# Patient Record
Sex: FEMALE | Race: WHITE | NOT HISPANIC OR LATINO | Employment: FULL TIME | URBAN - NONMETROPOLITAN AREA
[De-identification: names, ages, dates, MRNs, and addresses within clinical notes are randomized per-mention and may not be internally consistent; named-entity substitution may affect disease eponyms.]

---

## 2018-03-21 ENCOUNTER — OFFICE VISIT (OUTPATIENT)
Dept: SURGERY | Facility: CLINIC | Age: 38
End: 2018-03-21

## 2018-03-21 VITALS
HEART RATE: 82 BPM | WEIGHT: 152 LBS | HEIGHT: 69 IN | BODY MASS INDEX: 22.51 KG/M2 | DIASTOLIC BLOOD PRESSURE: 85 MMHG | SYSTOLIC BLOOD PRESSURE: 124 MMHG

## 2018-03-21 DIAGNOSIS — N61.1 BREAST ABSCESS: Primary | ICD-10-CM

## 2018-03-21 PROCEDURE — 99203 OFFICE O/P NEW LOW 30 MIN: CPT | Performed by: SURGERY

## 2018-03-21 PROCEDURE — 76642 ULTRASOUND BREAST LIMITED: CPT | Performed by: SURGERY

## 2018-03-21 RX ORDER — CLINDAMYCIN PHOSPHATE 900 MG/50ML
900 INJECTION INTRAVENOUS ONCE
Status: CANCELLED | OUTPATIENT
Start: 2018-04-02 | End: 2018-04-02

## 2018-03-21 NOTE — PROGRESS NOTES
"Subjective   Axel Bowers is a 38 y.o. female is here today for follow-up breast care still having problem with right nipple.    History of Present Illness  Ms. Bowers was seen in the office today for evaluation of a recurrent right breast abscess.  The patient was actually seen by myself for the same problem in October 2014.  At the time of that visit the problem had resolved and no further treatment was provided.  At the time of that visit in October, 2014 and incidental cyst was aspirated in the right breast.  The patient now presents stating that she had a recent episode where the breast became markedly swollen, tender, erythematous.  It did not drain.  She was treated with oral Bactrim as well as prednisone in the area has improved but the hardness and redness has not completely resolved.  No Known Allergies - patient states that amoxicillin and Augmentin do not work for her as she took them so much as a child.      Current Outpatient Prescriptions   Medication Sig Dispense Refill   • Cholecalciferol (VITAMIN D3) 5000 units capsule capsule        No current facility-administered medications for this visit.      History reviewed. No pertinent past medical history.  History reviewed. No pertinent surgical history.    The following portions of the patient's history were reviewed and updated as appropriate: allergies, current medications, past family history, past medical history, past social history, past surgical history and problem list.    Review of Systems  General: negative  Integumentary: negative  Eyes: glasses  ENT: negative  Respiratory: negative  Gastrointestinal: negative  Cardiovascular: negative  Neurological: negative  Psychiatric: negative  Hematologic/Lymphatic: negative  Genitourinary: negative  Musculoskeletal: negative  Endocrine: negative  Breasts: breast lump and breast pain    Objective   /85 (BP Location: Left arm, Patient Position: Sitting)   Pulse 82   Ht 175.3 cm (69\")   Wt 68.9 " kg (152 lb)   BMI 22.45 kg/m²    General:  This is a WD WN thin white female in no acute distress  Vital signs stable, afebrile  HEENT exam:  WNL. Sclera are anicteric.  EOMI  Neck:  supple, FROM.  No JVD.  Trachea midline.  No supraclavicular adenopathy  Lungs:  Respiratory effort normal. Auscultation: Clear, without wheezes, rhonchi, rales  Heart:  Regular rate and rhythm, without murmur, gallop, rub.  No pedal edema  Breasts: On visual inspection there is a small visible area of mild erythema in the 3 o'clock position of the right breast.  Examination of the right breast demonstrates induration at the 3:00 at the areola.  This does involve both the skin and the dermis and does extends slightly into the breast tissue but the majority of the breast tissue is without induration.  Examination of the left breast demonstrates no discrete mass, skin change, or axillary adenopathy.  Abdomen: Nontender, without hepatosplenomegaly  Musculoskeletal:  muscle strength/tone is normal.    Psyc:  alert, oriented x 3.  Mood and affect are appropriate  skin:  Warm with good turgor.  Without rash or lesion  extremities:  Examination of the extremities revealed no cyanosis, clubbing or edema.    Physical Exam   Pulmonary/Chest:       Area of induration at the edge of the areola in the 3 o'clock position.    Results/Data      Procedures   Limited Right Breast Ultrasound    Indication:  Palpable abnormality right breast    Description:  With use of the 12.5 MHz transducer the area of clinical concern was scanned in multiple planes.    Findings:  The area of concern was scanned in transverse and sagittal planes.  There is a hypoechoic area involving the dermis which extends down into the subcutaneous tissue at least 5 or 6 mm deep to the skin.  An abscess is not identified.    Impression:  Hypoechoic area associated with palpable abnormality suggesting residual infection.    Recommendation:  Followup as clinically  indicated    Assessment/Plan     Findings are compatible with a recurrent breast abscess which primarily appears to involve the skin rather than the deep tissues of the breast.  It is unlikely to resolve without surgical intervention.    Recommendation: Right breast excisional biopsy with excision of skin, subcutaneous tissue, involved breast tissue.         Discussion/Summary: The patient was advised that the recurrence rates following excision of these type of cysts are fairly high primarily due to their involvement of the nipple and the reluctance to excise a portion of the nipple at initial surgical intervention.  The patient is aware that this may happen and may require additional surgeries and ultimately part of the nipple to be removed.    Errors in dictation may reflect use of voice recognition software and not all errors in transcription may have been detected prior to signing.    No future appointments.

## 2018-03-21 NOTE — H&P
"Subjective   Axel Bowers is a 38 y.o. female is here today for follow-up breast care still having problem with right nipple.    History of Present Illness  Ms. Bowers was seen in the office today for evaluation of a recurrent right breast abscess.  The patient was actually seen by myself for the same problem in October 2014.  At the time of that visit the problem had resolved and no further treatment was provided.  At the time of that visit in October, 2014 and incidental cyst was aspirated in the right breast.  The patient now presents stating that she had a recent episode where the breast became markedly swollen, tender, erythematous.  It did not drain.  She was treated with oral Bactrim as well as prednisone in the area has improved but the hardness and redness has not completely resolved.  No Known Allergies - patient states that amoxicillin and Augmentin do not work for her as she took them so much as a child.      Current Outpatient Prescriptions   Medication Sig Dispense Refill   • Cholecalciferol (VITAMIN D3) 5000 units capsule capsule        No current facility-administered medications for this visit.      History reviewed. No pertinent past medical history.  History reviewed. No pertinent surgical history.    The following portions of the patient's history were reviewed and updated as appropriate: allergies, current medications, past family history, past medical history, past social history, past surgical history and problem list.    Review of Systems  General: negative  Integumentary: negative  Eyes: glasses  ENT: negative  Respiratory: negative  Gastrointestinal: negative  Cardiovascular: negative  Neurological: negative  Psychiatric: negative  Hematologic/Lymphatic: negative  Genitourinary: negative  Musculoskeletal: negative  Endocrine: negative  Breasts: breast lump and breast pain    Objective   /85 (BP Location: Left arm, Patient Position: Sitting)   Pulse 82   Ht 175.3 cm (69\")   Wt 68.9 " kg (152 lb)   BMI 22.45 kg/m²     General:  This is a WD WN thin white female in no acute distress  Vital signs stable, afebrile  HEENT exam:  WNL. Sclera are anicteric.  EOMI  Neck:  supple, FROM.  No JVD.  Trachea midline.  No supraclavicular adenopathy  Lungs:  Respiratory effort normal. Auscultation: Clear, without wheezes, rhonchi, rales  Heart:  Regular rate and rhythm, without murmur, gallop, rub.  No pedal edema  Breasts: On visual inspection there is a small visible area of mild erythema in the 3 o'clock position of the right breast.  Examination of the right breast demonstrates induration at the 3:00 at the areola.  This does involve both the skin and the dermis and does extends slightly into the breast tissue but the majority of the breast tissue is without induration.  Examination of the left breast demonstrates no discrete mass, skin change, or axillary adenopathy.  Abdomen: Nontender, without hepatosplenomegaly  Musculoskeletal:  muscle strength/tone is normal.    Psyc:  alert, oriented x 3.  Mood and affect are appropriate  skin:  Warm with good turgor.  Without rash or lesion  extremities:  Examination of the extremities revealed no cyanosis, clubbing or edema.    Physical Exam   Pulmonary/Chest:       Area of induration at the edge of the areola in the 3 o'clock position.    Results/Data      Procedures   Limited Right Breast Ultrasound    Indication:  Palpable abnormality right breast    Description:  With use of the 12.5 MHz transducer the area of clinical concern was scanned in multiple planes.    Findings:  The area of concern was scanned in transverse and sagittal planes.  There is a hypoechoic area involving the dermis which extends down into the subcutaneous tissue at least 5 or 6 mm deep to the skin.  An abscess is not identified.    Impression:  Hypoechoic area associated with palpable abnormality suggesting residual infection.    Recommendation:  Followup as clinically  indicated    Assessment/Plan     Findings are compatible with a recurrent breast abscess which primarily appears to involve the skin rather than the deep tissues of the breast.  It is unlikely to resolve without surgical intervention.    Recommendation: Right breast excisional biopsy with excision of skin, subcutaneous tissue, involved breast tissue.         Discussion/Summary: The patient was advised that the recurrence rates following excision of these type of cysts are fairly high primarily due to their involvement of the nipple and the reluctance to excise a portion of the nipple at initial surgical intervention.  The patient is aware that this may happen and may require additional surgeries and ultimately part of the nipple to be removed.    Errors in dictation may reflect use of voice recognition software and not all errors in transcription may have been detected prior to signing.    No future appointments.  This document has been electronically signed by Fatmata SEGURA MD on March 21, 2018 5:50 PM

## 2018-03-27 ENCOUNTER — TELEPHONE (OUTPATIENT)
Dept: SURGERY | Facility: CLINIC | Age: 38
End: 2018-03-27

## 2018-03-30 ENCOUNTER — TELEPHONE (OUTPATIENT)
Dept: SURGERY | Facility: CLINIC | Age: 38
End: 2018-03-30

## 2018-03-30 ENCOUNTER — APPOINTMENT (OUTPATIENT)
Dept: PREADMISSION TESTING | Facility: HOSPITAL | Age: 38
End: 2018-03-30

## 2018-03-30 DIAGNOSIS — N61.1 BREAST ABSCESS: ICD-10-CM

## 2018-03-30 LAB
ANION GAP SERPL CALCULATED.3IONS-SCNC: 6.6 MMOL/L (ref 3.6–11.2)
BASOPHILS # BLD AUTO: 0.01 10*3/MM3 (ref 0–0.3)
BASOPHILS NFR BLD AUTO: 0.2 % (ref 0–2)
BUN BLD-MCNC: 13 MG/DL (ref 7–21)
BUN/CREAT SERPL: 18.6 (ref 7–25)
CALCIUM SPEC-SCNC: 9.6 MG/DL (ref 7.7–10)
CHLORIDE SERPL-SCNC: 108 MMOL/L (ref 99–112)
CO2 SERPL-SCNC: 26.4 MMOL/L (ref 24.3–31.9)
CREAT BLD-MCNC: 0.7 MG/DL (ref 0.43–1.29)
DEPRECATED RDW RBC AUTO: 43.6 FL (ref 37–54)
EOSINOPHIL # BLD AUTO: 0.1 10*3/MM3 (ref 0–0.7)
EOSINOPHIL NFR BLD AUTO: 2.1 % (ref 0–5)
ERYTHROCYTE [DISTWIDTH] IN BLOOD BY AUTOMATED COUNT: 13.4 % (ref 11.5–14.5)
GFR SERPL CREATININE-BSD FRML MDRD: 95 ML/MIN/1.73
GLUCOSE BLD-MCNC: 79 MG/DL (ref 70–110)
HCT VFR BLD AUTO: 42.4 % (ref 37–47)
HGB BLD-MCNC: 13.8 G/DL (ref 12–16)
IMM GRANULOCYTES # BLD: 0.01 10*3/MM3 (ref 0–0.03)
IMM GRANULOCYTES NFR BLD: 0.2 % (ref 0–0.5)
LYMPHOCYTES # BLD AUTO: 2.36 10*3/MM3 (ref 1–3)
LYMPHOCYTES NFR BLD AUTO: 48.9 % (ref 21–51)
MCH RBC QN AUTO: 29.4 PG (ref 27–33)
MCHC RBC AUTO-ENTMCNC: 32.5 G/DL (ref 33–37)
MCV RBC AUTO: 90.2 FL (ref 80–94)
MONOCYTES # BLD AUTO: 0.49 10*3/MM3 (ref 0.1–0.9)
MONOCYTES NFR BLD AUTO: 10.1 % (ref 0–10)
NEUTROPHILS # BLD AUTO: 1.86 10*3/MM3 (ref 1.4–6.5)
NEUTROPHILS NFR BLD AUTO: 38.5 % (ref 30–70)
OSMOLALITY SERPL CALC.SUM OF ELEC: 280.3 MOSM/KG (ref 273–305)
PLATELET # BLD AUTO: 245 10*3/MM3 (ref 130–400)
PMV BLD AUTO: 10.9 FL (ref 6–10)
POTASSIUM BLD-SCNC: 3.7 MMOL/L (ref 3.5–5.3)
RBC # BLD AUTO: 4.7 10*6/MM3 (ref 4.2–5.4)
SODIUM BLD-SCNC: 141 MMOL/L (ref 135–153)
WBC NRBC COR # BLD: 4.83 10*3/MM3 (ref 4.5–12.5)

## 2018-03-30 PROCEDURE — 85025 COMPLETE CBC W/AUTO DIFF WBC: CPT | Performed by: SURGERY

## 2018-03-30 PROCEDURE — 36415 COLL VENOUS BLD VENIPUNCTURE: CPT

## 2018-03-30 PROCEDURE — 80048 BASIC METABOLIC PNL TOTAL CA: CPT | Performed by: SURGERY

## 2018-03-30 RX ORDER — MULTIPLE VITAMINS W/ MINERALS TAB 9MG-400MCG
1 TAB ORAL DAILY
COMMUNITY

## 2018-03-30 NOTE — TELEPHONE ENCOUNTER
Called to let patient know that her arrival time Monday is 7:30, and that she will need to be NPO after midnight Sunday night, and to bring a .    Patient's  answered, and said he would relay the message to have her call me

## 2018-04-02 ENCOUNTER — ANESTHESIA EVENT (OUTPATIENT)
Dept: PERIOP | Facility: HOSPITAL | Age: 38
End: 2018-04-02

## 2018-04-02 ENCOUNTER — ANESTHESIA (OUTPATIENT)
Dept: PERIOP | Facility: HOSPITAL | Age: 38
End: 2018-04-02

## 2018-04-02 ENCOUNTER — HOSPITAL ENCOUNTER (OUTPATIENT)
Facility: HOSPITAL | Age: 38
Setting detail: HOSPITAL OUTPATIENT SURGERY
Discharge: HOME | End: 2018-04-02
Attending: SURGERY | Admitting: SURGERY

## 2018-04-02 VITALS
TEMPERATURE: 98.2 F | WEIGHT: 152 LBS | RESPIRATION RATE: 16 BRPM | DIASTOLIC BLOOD PRESSURE: 80 MMHG | HEIGHT: 69 IN | HEART RATE: 76 BPM | SYSTOLIC BLOOD PRESSURE: 126 MMHG | BODY MASS INDEX: 22.51 KG/M2 | OXYGEN SATURATION: 99 %

## 2018-04-02 DIAGNOSIS — N61.1 BREAST ABSCESS: ICD-10-CM

## 2018-04-02 LAB
B-HCG UR QL: NEGATIVE
INTERNAL NEGATIVE CONTROL: NEGATIVE
INTERNAL POSITIVE CONTROL: POSITIVE
Lab: NORMAL

## 2018-04-02 PROCEDURE — 19120 REMOVAL OF BREAST LESION: CPT | Performed by: SURGERY

## 2018-04-02 PROCEDURE — 25010000002 FENTANYL CITRATE (PF) 100 MCG/2ML SOLUTION: Performed by: NURSE ANESTHETIST, CERTIFIED REGISTERED

## 2018-04-02 PROCEDURE — 25010000002 ONDANSETRON PER 1 MG: Performed by: NURSE ANESTHETIST, CERTIFIED REGISTERED

## 2018-04-02 PROCEDURE — 25010000002 DEXAMETHASONE PER 1 MG: Performed by: NURSE ANESTHETIST, CERTIFIED REGISTERED

## 2018-04-02 PROCEDURE — A4648 IMPLANTABLE TISSUE MARKER: HCPCS | Performed by: SURGERY

## 2018-04-02 PROCEDURE — 25010000002 MIDAZOLAM PER 1 MG: Performed by: NURSE ANESTHETIST, CERTIFIED REGISTERED

## 2018-04-02 PROCEDURE — 25010000002 PROPOFOL 10 MG/ML EMULSION: Performed by: NURSE ANESTHETIST, CERTIFIED REGISTERED

## 2018-04-02 PROCEDURE — 25010000002 KETOROLAC TROMETHAMINE PER 15 MG: Performed by: NURSE ANESTHETIST, CERTIFIED REGISTERED

## 2018-04-02 RX ORDER — PROPOFOL 10 MG/ML
VIAL (ML) INTRAVENOUS AS NEEDED
Status: DISCONTINUED | OUTPATIENT
Start: 2018-04-02 | End: 2018-04-02 | Stop reason: SURG

## 2018-04-02 RX ORDER — DEXAMETHASONE SODIUM PHOSPHATE 10 MG/ML
INJECTION INTRAMUSCULAR; INTRAVENOUS AS NEEDED
Status: DISCONTINUED | OUTPATIENT
Start: 2018-04-02 | End: 2018-04-02 | Stop reason: SURG

## 2018-04-02 RX ORDER — IPRATROPIUM BROMIDE AND ALBUTEROL SULFATE 2.5; .5 MG/3ML; MG/3ML
3 SOLUTION RESPIRATORY (INHALATION) ONCE AS NEEDED
Status: DISCONTINUED | OUTPATIENT
Start: 2018-04-02 | End: 2018-04-02 | Stop reason: HOSPADM

## 2018-04-02 RX ORDER — ONDANSETRON 2 MG/ML
4 INJECTION INTRAMUSCULAR; INTRAVENOUS ONCE AS NEEDED
Status: DISCONTINUED | OUTPATIENT
Start: 2018-04-02 | End: 2018-04-02 | Stop reason: HOSPADM

## 2018-04-02 RX ORDER — CLINDAMYCIN PHOSPHATE 900 MG/50ML
900 INJECTION INTRAVENOUS ONCE
Status: COMPLETED | OUTPATIENT
Start: 2018-04-02 | End: 2018-04-02

## 2018-04-02 RX ORDER — FENTANYL CITRATE 50 UG/ML
50 INJECTION, SOLUTION INTRAMUSCULAR; INTRAVENOUS
Status: DISCONTINUED | OUTPATIENT
Start: 2018-04-02 | End: 2018-04-02 | Stop reason: HOSPADM

## 2018-04-02 RX ORDER — FENTANYL CITRATE 50 UG/ML
INJECTION, SOLUTION INTRAMUSCULAR; INTRAVENOUS AS NEEDED
Status: DISCONTINUED | OUTPATIENT
Start: 2018-04-02 | End: 2018-04-02 | Stop reason: SURG

## 2018-04-02 RX ORDER — SODIUM CHLORIDE 0.9 % (FLUSH) 0.9 %
1-10 SYRINGE (ML) INJECTION AS NEEDED
Status: DISCONTINUED | OUTPATIENT
Start: 2018-04-02 | End: 2018-04-02 | Stop reason: HOSPADM

## 2018-04-02 RX ORDER — FAMOTIDINE 10 MG/ML
INJECTION, SOLUTION INTRAVENOUS AS NEEDED
Status: DISCONTINUED | OUTPATIENT
Start: 2018-04-02 | End: 2018-04-02 | Stop reason: SURG

## 2018-04-02 RX ORDER — MAGNESIUM HYDROXIDE 1200 MG/15ML
LIQUID ORAL AS NEEDED
Status: DISCONTINUED | OUTPATIENT
Start: 2018-04-02 | End: 2018-04-02 | Stop reason: HOSPADM

## 2018-04-02 RX ORDER — MIDAZOLAM HYDROCHLORIDE 1 MG/ML
INJECTION INTRAMUSCULAR; INTRAVENOUS AS NEEDED
Status: DISCONTINUED | OUTPATIENT
Start: 2018-04-02 | End: 2018-04-02 | Stop reason: SURG

## 2018-04-02 RX ORDER — SODIUM CHLORIDE, SODIUM LACTATE, POTASSIUM CHLORIDE, CALCIUM CHLORIDE 600; 310; 30; 20 MG/100ML; MG/100ML; MG/100ML; MG/100ML
125 INJECTION, SOLUTION INTRAVENOUS CONTINUOUS
Status: DISCONTINUED | OUTPATIENT
Start: 2018-04-02 | End: 2018-04-02 | Stop reason: HOSPADM

## 2018-04-02 RX ORDER — LIDOCAINE HYDROCHLORIDE 20 MG/ML
INJECTION, SOLUTION INFILTRATION; PERINEURAL AS NEEDED
Status: DISCONTINUED | OUTPATIENT
Start: 2018-04-02 | End: 2018-04-02 | Stop reason: SURG

## 2018-04-02 RX ORDER — OXYCODONE HYDROCHLORIDE AND ACETAMINOPHEN 5; 325 MG/1; MG/1
1 TABLET ORAL EVERY 6 HOURS PRN
Qty: 20 TABLET | Refills: 0 | Status: SHIPPED | OUTPATIENT
Start: 2018-04-02 | End: 2018-04-12

## 2018-04-02 RX ORDER — MEPERIDINE HYDROCHLORIDE 50 MG/ML
12.5 INJECTION INTRAMUSCULAR; INTRAVENOUS; SUBCUTANEOUS
Status: DISCONTINUED | OUTPATIENT
Start: 2018-04-02 | End: 2018-04-02 | Stop reason: HOSPADM

## 2018-04-02 RX ORDER — ONDANSETRON 2 MG/ML
INJECTION INTRAMUSCULAR; INTRAVENOUS AS NEEDED
Status: DISCONTINUED | OUTPATIENT
Start: 2018-04-02 | End: 2018-04-02 | Stop reason: SURG

## 2018-04-02 RX ORDER — KETOROLAC TROMETHAMINE 30 MG/ML
INJECTION, SOLUTION INTRAMUSCULAR; INTRAVENOUS AS NEEDED
Status: DISCONTINUED | OUTPATIENT
Start: 2018-04-02 | End: 2018-04-02 | Stop reason: SURG

## 2018-04-02 RX ADMIN — ONDANSETRON 4 MG: 2 INJECTION, SOLUTION INTRAMUSCULAR; INTRAVENOUS at 09:09

## 2018-04-02 RX ADMIN — SODIUM CHLORIDE, POTASSIUM CHLORIDE, SODIUM LACTATE AND CALCIUM CHLORIDE 125 ML/HR: 600; 310; 30; 20 INJECTION, SOLUTION INTRAVENOUS at 08:30

## 2018-04-02 RX ADMIN — KETOROLAC TROMETHAMINE 30 MG: 30 INJECTION, SOLUTION INTRAMUSCULAR; INTRAVENOUS at 09:23

## 2018-04-02 RX ADMIN — FAMOTIDINE 20 MG: 10 INJECTION, SOLUTION INTRAVENOUS at 09:09

## 2018-04-02 RX ADMIN — CLINDAMYCIN PHOSPHATE 900 MG: 18 INJECTION, SOLUTION INTRAVENOUS at 08:53

## 2018-04-02 RX ADMIN — LIDOCAINE HYDROCHLORIDE 40 MG: 20 INJECTION, SOLUTION INFILTRATION; PERINEURAL at 08:59

## 2018-04-02 RX ADMIN — FENTANYL CITRATE 50 MCG: 50 INJECTION, SOLUTION INTRAMUSCULAR; INTRAVENOUS at 09:41

## 2018-04-02 RX ADMIN — PROPOFOL 150 MG: 10 INJECTION, EMULSION INTRAVENOUS at 08:59

## 2018-04-02 RX ADMIN — FENTANYL CITRATE 50 MCG: 50 INJECTION INTRAMUSCULAR; INTRAVENOUS at 09:11

## 2018-04-02 RX ADMIN — FENTANYL CITRATE 100 MCG: 50 INJECTION INTRAMUSCULAR; INTRAVENOUS at 08:53

## 2018-04-02 RX ADMIN — DEXAMETHASONE SODIUM PHOSPHATE 4 MG: 10 INJECTION INTRAMUSCULAR; INTRAVENOUS at 09:09

## 2018-04-02 RX ADMIN — MIDAZOLAM HYDROCHLORIDE 2 MG: 1 INJECTION, SOLUTION INTRAMUSCULAR; INTRAVENOUS at 08:53

## 2018-04-02 RX ADMIN — FENTANYL CITRATE 50 MCG: 50 INJECTION INTRAMUSCULAR; INTRAVENOUS at 09:06

## 2018-04-02 RX ADMIN — FENTANYL CITRATE 50 MCG: 50 INJECTION, SOLUTION INTRAMUSCULAR; INTRAVENOUS at 09:47

## 2018-04-02 NOTE — OP NOTE
Right breast biopsy    Surgeon:  Fatmata Billings M.D., F.A.CMilanSMilan    Assistant;  Vida    Pre-op:  Recurrent right breast abscess    Post-op:  Same    Anesthesia:  general    Indications: Recurrent right breast abscess       Procedure Details   After obtaining informed consent and with venous compression boots in place and receiving preoperative antibiotics the patient was taken to the operating room and placed under anesthesia.  The right breast was prepped and draped in a sterile fashion.  There is an area of induration under the areola and the 3 o'clock position.  An elliptical incision oriented in a horizontal plane was made from the nipple overlying this area.  It appeared to be a small sebaceous cyst was identified.  This was excised to grossly clear margins.  After obtaining hemostasis the incision was closed in a 2 layer closure of interrupted 3-0 Vicryl subdermal sutures, followed by 4-0 nylon.  Dressing was placed.  Patient tolerated the procedure well and was taken to the recovery room in stable condition.    Findings:      Estimated Blood Loss:  Minimal    Blood administered:            Drains: none           Specimens:   ID Type Source Tests Collected by Time   A : right breast biopsy Tissue Breast, Right Central TISSUE PATHOLOGY EXAM Fatmata Billings MD 4/2/2018 0916        Grafts and Implants: None        Complications:  none           Disposition: PACU - hemodynamically stable.           Condition: stable

## 2018-04-02 NOTE — ANESTHESIA POSTPROCEDURE EVALUATION
Patient: Axel Bowers    Procedure Summary     Date:  04/02/18 Room / Location:   [facility] /  [facility]    Anesthesia Start:  0853 Anesthesia Stop:  0931    Procedure:  Right breast biopsy (Right Breast) Diagnosis:       Breast abscess      (Breast abscess [N61.1])    Surgeon:  Fatmata Billings MD Provider:  Lv Keith MD    Anesthesia Type:  general ASA Status:  2          Anesthesia Type: general  Last vitals  BP   118/87 (04/02/18 0942)   Temp   97.6 °F (36.4 °C) (04/02/18 0932)   Pulse   81 (04/02/18 0942)   Resp   14 (04/02/18 0942)     SpO2   98 % (04/02/18 0942)     Post Anesthesia Care and Evaluation    Patient location during evaluation: PHASE II  Patient participation: complete - patient participated  Level of consciousness: awake and alert  Pain score: 1  Pain management: adequate  Airway patency: patent  Anesthetic complications: No anesthetic complications  PONV Status: controlled  Cardiovascular status: acceptable  Respiratory status: acceptable  Hydration status: acceptable

## 2018-04-02 NOTE — ANESTHESIA PROCEDURE NOTES
Airway    General Information and Staff    CRNA: BRYAN WARD    Indications and Patient Condition    Preoxygenated: yes      Final Airway Details  Final airway type: supraglottic airway      Successful airway: classic  Size 3    Number of attempts at approach: 1

## 2018-04-02 NOTE — ANESTHESIA PREPROCEDURE EVALUATION
Anesthesia Evaluation     Patient summary reviewed and Nursing notes reviewed   no history of anesthetic complications:  NPO Solid Status: > 8 hours  NPO Liquid Status: > 8 hours           Airway   Mallampati: II  TM distance: >3 FB  Neck ROM: full  no difficulty expected  Dental - normal exam   (+) edentulous, lower dentures and upper dentures    Pulmonary - normal exam   (+) a smoker Former,   (-) asthma  Cardiovascular - negative cardio ROS and normal exam  Exercise tolerance: good (4-7 METS)    NYHA Classification: II        Neuro/Psych- negative ROS  (-) seizures, CVA  GI/Hepatic/Renal/Endo - negative ROS     Musculoskeletal (-) negative ROS    Abdominal  - normal exam    Bowel sounds: normal.   Substance History - negative use     OB/GYN negative ob/gyn ROS         Other - negative ROS                       Anesthesia Plan    ASA 2     general     Anesthetic plan and risks discussed with patient and spouse/significant other.  Use of blood products discussed with patient and spouse/significant other  Consented to blood products.

## 2018-04-04 LAB
LAB AP CASE REPORT: NORMAL
Lab: NORMAL
PATH REPORT.FINAL DX SPEC: NORMAL

## 2018-04-12 ENCOUNTER — OFFICE VISIT (OUTPATIENT)
Dept: SURGERY | Facility: CLINIC | Age: 38
End: 2018-04-12

## 2018-04-12 VITALS
HEART RATE: 65 BPM | DIASTOLIC BLOOD PRESSURE: 93 MMHG | SYSTOLIC BLOOD PRESSURE: 133 MMHG | BODY MASS INDEX: 22.45 KG/M2 | WEIGHT: 151.6 LBS | HEIGHT: 69 IN

## 2018-04-12 DIAGNOSIS — Z51.89 VISIT FOR WOUND CHECK: ICD-10-CM

## 2018-04-12 DIAGNOSIS — N61.1 BREAST ABSCESS: Primary | ICD-10-CM

## 2018-04-12 PROCEDURE — 99024 POSTOP FOLLOW-UP VISIT: CPT | Performed by: SURGERY

## 2018-04-12 NOTE — PROGRESS NOTES
"Subjective   Axel Bowers is a 38 y.o. female {Specialty - why patient     History of Present Illness    Allergies   Allergen Reactions   • Lortab [Hydrocodone-Acetaminophen] Hives       And nauseated       Current Outpatient Prescriptions   Medication Sig Dispense Refill   • Cholecalciferol (VITAMIN D3) 5000 units capsule capsule 5,000 Units 2 (Two) Times a Day.     • Multiple Vitamins-Minerals (MULTIVITAMIN WITH MINERALS) tablet tablet Take 1 tablet by mouth Daily.     • oxyCODONE-acetaminophen (PERCOCET) 5-325 MG per tablet Take 1 tablet by mouth Every 6 (Six) Hours As Needed for Moderate Pain 20 tablet 0     No current facility-administered medications for this visit.      History reviewed. No pertinent past medical history.  Past Surgical History:   Procedure Laterality Date   • BREAST BIOPSY Right 4/2/2018    Procedure: Right breast biopsy;  Surgeon: Fatmata Billings MD;  Location: [facility];  Service: General         {Common H&P Review Areas:09017}    Review of systems:  Unchanged from prior except HPI    Objective   Ht 175.3 cm (69\")   Wt 70.8 kg (156 lb)   BMI 23.04 kg/m²    Physical Exam    Results/Data      Procedures     Assessment/Plan       No problem-specific Assessment & Plan notes found for this encounter.           Discussion/Summary    Patient's Body mass index is 23.04 kg/m². BMI is {BMI range:53229}.       Errors in dictation may reflect use of voice recognition software and not all errors in transcription may have been detected prior to signing.    Future Appointments  Date Time Provider Department Center   4/12/2018 8:40 AM Fatmata Billings MD [facility] None     "

## 2018-04-12 NOTE — PROGRESS NOTES
"Subjective   Axel Bowers is a 38 y.o. female {Specialty - why patient     History of Present Illness    Allergies   Allergen Reactions   • Lortab [Hydrocodone-Acetaminophen] Hives       And nauseated       Current Outpatient Prescriptions   Medication Sig Dispense Refill   • Cholecalciferol (VITAMIN D3) 5000 units capsule capsule 5,000 Units 2 (Two) Times a Day.     • Multiple Vitamins-Minerals (MULTIVITAMIN WITH MINERALS) tablet tablet Take 1 tablet by mouth Daily.       No current facility-administered medications for this visit.      History reviewed. No pertinent past medical history.  Past Surgical History:   Procedure Laterality Date   • BREAST BIOPSY Right 4/2/2018    Procedure: Right breast biopsy;  Surgeon: Fatmata Billings MD;  Location: [facility];  Service: General         {Common H&P Review Areas:98020}    Review of systems:  Unchanged from prior except HPI    Objective   /93 (BP Location: Left arm, Patient Position: Sitting)   Pulse 65   Ht 175.3 cm (69\")   Wt 68.8 kg (151 lb 9.6 oz)   BMI 22.39 kg/m²    Physical Exam    Results/Data      Procedures     Assessment/Plan       No problem-specific Assessment & Plan notes found for this encounter.           Discussion/Summary    Patient's Body mass index is 22.39 kg/m². BMI is {BMI range:59571}.       Errors in dictation may reflect use of voice recognition software and not all errors in transcription may have been detected prior to signing.    No future appointments.  "

## 2018-04-12 NOTE — PROGRESS NOTES
"Subjective   Axel Bowers is a 38 y.o. female here today for post-op and pathology report.    History of Present Illness  Ms. Bowers was seen in the office today for her first postoperative visit following excision of a recurrent abscess in the right areola.  She voices no complaints other than the sutures are starting to itch  Allergies   Allergen Reactions   • Lortab [Hydrocodone-Acetaminophen] Hives       And nauseated         Current Outpatient Prescriptions   Medication Sig Dispense Refill   • Cholecalciferol (VITAMIN D3) 5000 units capsule capsule 5,000 Units 2 (Two) Times a Day.     • Multiple Vitamins-Minerals (MULTIVITAMIN WITH MINERALS) tablet tablet Take 1 tablet by mouth Daily.       No current facility-administered medications for this visit.        Objective   /93 (BP Location: Left arm, Patient Position: Sitting)   Pulse 65   Ht 175.3 cm (69\")   Wt 68.8 kg (151 lb 9.6 oz)   BMI 22.39 kg/m²    Physical Exam  On examination this is a well-developed well-nourished white female in no acute distress  HEENT examination: Within normal limits.  Conjunctiva pink.  Nose and ears appear normal.  Neck: Supple, full range of motion.  No JVD.  Musculoskeletal: Full range of motion all extremities without focal weakness. Normal gait. No digital cyanosis.  Psych: Patient is alert, oriented x3. Mood and affect are appropriate.  Skin:  Exam of the right breast demonstrates a healing scar in the 3 o'clock position of the areola.  No drainage.  Sutures were removed without complication  Results/Data  Pathology report was discussed    Procedures     Assessment/Plan   Stable course, status post excision of recurrent right areola abscess    Follow-up as needed  The issue of recurrence was discussed       Discussion/Summary    Errors in dictation may reflect use of voice recognition software and not all errors in transcription may have been detected prior to signing.    No future appointments.    "

## 2018-10-03 ENCOUNTER — OFFICE VISIT (OUTPATIENT)
Dept: SURGERY | Facility: CLINIC | Age: 38
End: 2018-10-03

## 2018-10-03 VITALS
HEIGHT: 69 IN | DIASTOLIC BLOOD PRESSURE: 94 MMHG | HEART RATE: 74 BPM | WEIGHT: 143.8 LBS | BODY MASS INDEX: 21.3 KG/M2 | SYSTOLIC BLOOD PRESSURE: 138 MMHG

## 2018-10-03 DIAGNOSIS — N61.1 BREAST ABSCESS: Primary | ICD-10-CM

## 2018-10-03 PROCEDURE — 99213 OFFICE O/P EST LOW 20 MIN: CPT | Performed by: SURGERY

## 2018-10-03 RX ORDER — SULFAMETHOXAZOLE AND TRIMETHOPRIM 800; 160 MG/1; MG/1
1 TABLET ORAL 2 TIMES DAILY
Qty: 20 TABLET | Refills: 0 | Status: SHIPPED | OUTPATIENT
Start: 2018-10-03 | End: 2018-10-13

## 2018-10-03 NOTE — PROGRESS NOTES
"Subjective   Axel Bowers is a 38 y.o. female her today for a right breast re-check.    History of Present Illness  Ms. Bowers was seen in the office today for a recurrent right retroareolar abscess.  She underwent excision in April of this year and did well until Saturday when she woke up with the area hot and red  Allergies   Allergen Reactions   • Lortab [Hydrocodone-Acetaminophen] Hives       And nauseated       Current Outpatient Prescriptions   Medication Sig Dispense Refill   • Cholecalciferol (VITAMIN D3) 5000 units capsule capsule 5,000 Units 2 (Two) Times a Day.     • Multiple Vitamins-Minerals (MULTIVITAMIN WITH MINERALS) tablet tablet Take 1 tablet by mouth Daily.       No current facility-administered medications for this visit.      History reviewed. No pertinent past medical history.  Past Surgical History:   Procedure Laterality Date   • BREAST BIOPSY Right 4/2/2018    Procedure: Right breast biopsy;  Surgeon: Fatmata Billings MD;  Location: [facility];  Service: General     The following portions of the patient's history were reviewed and updated as appropriate: allergies, current medications, past family history, past medical history, past social history, past surgical history and problem list.    Review of systems:  Unchanged from prior except HPI    Objective   /94 (BP Location: Left arm)   Pulse 74   Ht 175.3 cm (69\")   Wt 65.2 kg (143 lb 12.8 oz)   BMI 21.24 kg/m²    Physical Exam  General:  This is a WD WN white female in no acute distress  Vital signs stable, afebrile  HEENT exam:  WNL. Sclera are anicteric.  EOMI  Neck:  supple, FROM.  No JVD.  Trachea midline.  No palpable thyroid nodules. No supraclavicular adenopathy  Lungs:  Respiratory effort normal. Auscultation: Clear, without wheezes, rhonchi, rales  Heart:  Regular rate and rhythm, without murmur, gallop, rub.  No pedal edema  Breasts: On visual inspection the breasts are symmetrical.  Examination of the right breast " demonstrates some deformity of the nipple from prior surgery.  There is a 1 cm area of fullness and erythema at the medial aspect of the areola consistent with a small recurrent retroareolar abscess.  The remainder of the right breast examination is unremarkable.  There is no right axillary adenopathy.  Examination of the left breast demonstrates no discrete mass, skin change, or axillary adenopathy.  Abdomen: Nontender, without hepatosplenomegaly  Musculoskeletal:  muscle strength/tone is normal.    Psyc:  alert, oriented x 3.  Mood and affect are appropriate  skin:  Warm with good turgor.  Without rash or lesion  extremities:  Examination of the extremities revealed no cyanosis, clubbing or edema.    Results/Data      Procedures     Assessment/Plan   Recurrent retroareolar abscess    Start oral antibiotics  Schedule reexcision of the area         Discussion/Summary    Patient's Body mass index is 21.24 kg/m². BMI is within normal parameters. No follow-up required.       Errors in dictation may reflect use of voice recognition software and not all errors in transcription may have been detected prior to signing.    No future appointments.

## 2018-10-06 NOTE — H&P
"Subjective   Axel Bowers is a 38 y.o. female her today for a right breast re-check.    History of Present Illness  Ms. Bowers was seen in the office today for a recurrent right retroareolar abscess.  She underwent excision in April of this year and did well until Saturday when she woke up with the area hot and red  Allergies   Allergen Reactions   • Lortab [Hydrocodone-Acetaminophen] Hives       And nauseated       Current Outpatient Prescriptions   Medication Sig Dispense Refill   • Cholecalciferol (VITAMIN D3) 5000 units capsule capsule 5,000 Units 2 (Two) Times a Day.     • Multiple Vitamins-Minerals (MULTIVITAMIN WITH MINERALS) tablet tablet Take 1 tablet by mouth Daily.       No current facility-administered medications for this visit.      History reviewed. No pertinent past medical history.  Past Surgical History:   Procedure Laterality Date   • BREAST BIOPSY Right 4/2/2018    Procedure: Right breast biopsy;  Surgeon: Fatmata Billings MD;  Location: [facility];  Service: General     The following portions of the patient's history were reviewed and updated as appropriate: allergies, current medications, past family history, past medical history, past social history, past surgical history and problem list.    Review of systems:  Unchanged from prior except HPI    Objective   /94 (BP Location: Left arm)   Pulse 74   Ht 175.3 cm (69\")   Wt 65.2 kg (143 lb 12.8 oz)   BMI 21.24 kg/m²     Physical Exam  General:  This is a WD WN white female in no acute distress  Vital signs stable, afebrile  HEENT exam:  WNL. Sclera are anicteric.  EOMI  Neck:  supple, FROM.  No JVD.  Trachea midline.  No palpable thyroid nodules. No supraclavicular adenopathy  Lungs:  Respiratory effort normal. Auscultation: Clear, without wheezes, rhonchi, rales  Heart:  Regular rate and rhythm, without murmur, gallop, rub.  No pedal edema  Breasts: On visual inspection the breasts are symmetrical.  Examination of the right breast " demonstrates some deformity of the nipple from prior surgery.  There is a 1 cm area of fullness and erythema at the medial aspect of the areola consistent with a small recurrent retroareolar abscess.  The remainder of the right breast examination is unremarkable.  There is no right axillary adenopathy.  Examination of the left breast demonstrates no discrete mass, skin change, or axillary adenopathy.  Abdomen: Nontender, without hepatosplenomegaly  Musculoskeletal:  muscle strength/tone is normal.    Psyc:  alert, oriented x 3.  Mood and affect are appropriate  skin:  Warm with good turgor.  Without rash or lesion  extremities:  Examination of the extremities revealed no cyanosis, clubbing or edema.    Results/Data      Procedures     Assessment/Plan   Recurrent retroareolar abscess    Start oral antibiotics  Schedule reexcision of the area         Discussion/Summary    Patient's Body mass index is 21.24 kg/m². BMI is within normal parameters. No follow-up required.       Errors in dictation may reflect use of voice recognition software and not all errors in transcription may have been detected prior to signing.    No future appointments.  This document has been electronically signed by Fatmata SEGURA MD on October 6, 2018 6:07 PM

## 2018-10-12 ENCOUNTER — TELEPHONE (OUTPATIENT)
Dept: SURGERY | Facility: CLINIC | Age: 38
End: 2018-10-12

## 2018-10-12 ENCOUNTER — APPOINTMENT (OUTPATIENT)
Dept: PREADMISSION TESTING | Facility: HOSPITAL | Age: 38
End: 2018-10-12

## 2018-10-12 DIAGNOSIS — N61.1 BREAST ABSCESS: ICD-10-CM

## 2018-10-12 LAB
ANION GAP SERPL CALCULATED.3IONS-SCNC: 5.3 MMOL/L (ref 3.6–11.2)
BASOPHILS # BLD AUTO: 0.03 10*3/MM3 (ref 0–0.3)
BASOPHILS NFR BLD AUTO: 0.7 % (ref 0–2)
BUN BLD-MCNC: 8 MG/DL (ref 7–21)
BUN/CREAT SERPL: 10.8 (ref 7–25)
CALCIUM SPEC-SCNC: 9.1 MG/DL (ref 7.7–10)
CHLORIDE SERPL-SCNC: 113 MMOL/L (ref 99–112)
CO2 SERPL-SCNC: 25.7 MMOL/L (ref 24.3–31.9)
CREAT BLD-MCNC: 0.74 MG/DL (ref 0.43–1.29)
DEPRECATED RDW RBC AUTO: 43.3 FL (ref 37–54)
EOSINOPHIL # BLD AUTO: 0.05 10*3/MM3 (ref 0–0.7)
EOSINOPHIL NFR BLD AUTO: 1.2 % (ref 0–5)
ERYTHROCYTE [DISTWIDTH] IN BLOOD BY AUTOMATED COUNT: 13.2 % (ref 11.5–14.5)
GFR SERPL CREATININE-BSD FRML MDRD: 89 ML/MIN/1.73
GLUCOSE BLD-MCNC: 65 MG/DL (ref 70–110)
HCT VFR BLD AUTO: 41.6 % (ref 37–47)
HGB BLD-MCNC: 14 G/DL (ref 12–16)
IMM GRANULOCYTES # BLD: 0.01 10*3/MM3 (ref 0–0.03)
IMM GRANULOCYTES NFR BLD: 0.2 % (ref 0–0.5)
LYMPHOCYTES # BLD AUTO: 2.19 10*3/MM3 (ref 1–3)
LYMPHOCYTES NFR BLD AUTO: 52.4 % (ref 21–51)
MCH RBC QN AUTO: 30.5 PG (ref 27–33)
MCHC RBC AUTO-ENTMCNC: 33.7 G/DL (ref 33–37)
MCV RBC AUTO: 90.6 FL (ref 80–94)
MONOCYTES # BLD AUTO: 0.38 10*3/MM3 (ref 0.1–0.9)
MONOCYTES NFR BLD AUTO: 9.1 % (ref 0–10)
NEUTROPHILS # BLD AUTO: 1.52 10*3/MM3 (ref 1.4–6.5)
NEUTROPHILS NFR BLD AUTO: 36.4 % (ref 30–70)
OSMOLALITY SERPL CALC.SUM OF ELEC: 283.3 MOSM/KG (ref 273–305)
PLATELET # BLD AUTO: 230 10*3/MM3 (ref 130–400)
PMV BLD AUTO: 11.5 FL (ref 6–10)
POTASSIUM BLD-SCNC: 3.7 MMOL/L (ref 3.5–5.3)
RBC # BLD AUTO: 4.59 10*6/MM3 (ref 4.2–5.4)
SODIUM BLD-SCNC: 144 MMOL/L (ref 135–153)
WBC NRBC COR # BLD: 4.18 10*3/MM3 (ref 4.5–12.5)

## 2018-10-12 PROCEDURE — 36415 COLL VENOUS BLD VENIPUNCTURE: CPT

## 2018-10-12 PROCEDURE — 80048 BASIC METABOLIC PNL TOTAL CA: CPT | Performed by: SURGERY

## 2018-10-12 PROCEDURE — 85025 COMPLETE CBC W/AUTO DIFF WBC: CPT | Performed by: SURGERY

## 2018-10-12 NOTE — DISCHARGE INSTRUCTIONS
Educational brochure given to patient regarding pain control and mindful breathing.    TAKE the following medications the morning of surgery:    All heart or blood pressure medications    Please discontinue all blood thinners and anticoagulants (except aspirin) prior to surgery as per your surgeon and cardiologist instructions.  Aspirin may be continued up to the day prior to surgery.    HOLD all diabetic medications the morning of surgery as order by physician.    Please follow instructions on use of prep cloths provided by nurse. Return instruction sheet with stickers attached to pre-op nurse on day of surgery.    Arrival time for surgery on 10/15/2018 is 0830 am.    General Instructions:  • Do NOT eat or drink after midnight 10/14/2018 which includes water, mints, or gum.  • You may brush your teeth. Dental appliances that are removable must be taken out day of surgery.  • Do NOT smoke, chew tobacco, or drink alcohol within 24 hours prior to surgery.  • Bring medications in original bottles, any inhalers and if applicable your C-PAP/BI-PAP machine  • Bring any papers given to you in the doctor’s office  • Wear clean, comfortable clothes and socks  • Do NOT wear contact lenses or make-up or dark nail polish.  Bring a case for your glasses if applicable.  • Bring crutches or walker if applicable  • Leave all other valuables and jewelry at home  • If you were given a blood bank armband, continue to wear it until discharged.    Preventing a Surgical Site Infection:  • Shower the night before surgery (unless instructed otherwise) using a fresh bar of anti-bacterial soap (such as Dial) and clean washcloth.  Dry with a clean towel and dress in clean clothing.  • For 2 to 3 days before surgery, avoid shaving with a razor near where you will have surgery because the razor can irritate skin and make it easier to develop an infection.  Ask your surgeon if you will be receiving antibiotics prior to surgery.  • Make sure  you, your family, and all healthcare providers clean their hands with soap and water or an alcohol-based hand  before caring for you or your wound.  • If at all possible, quit smoking as many days before surgery as you can.    Day of Surgery:  Upon arrival, a pre-op nurse and anesthesiologist will review your health history, obtain vital signs, and answer questions you may have.  The only belongings needed at this time will be your home medications and if applicable you C-PAP/BI-PAP machine.  If you are staying overnight, your family can leave the rest of your belongings in the car and bring them to your room later.  A pre-op nurse will start an IV and you may receive medication in preparation for surgery.  Due to patient privacy and limited space, only one member of your family will be able to accompany you in the pre-op area.  While you are in surgery your family should notify the waiting room  if they leave the waiting room area and provide a contact number.  Please be aware that surgery does come with discomfort.  We want to make every effort to control your discomfort so please discuss any uncontrolled symptoms with your nurse.  Your doctor will most likely have prescribed pain medications.  If you are going home after surgery you will receive individualized written care instructions before being discharged.  A responsible adult must drive you to and from the hospital on the day of surgery and stay with you for 24 hours.  If you are staying overnight following surgery, you will be transported to your hospital room following the recovery period.

## 2018-10-15 ENCOUNTER — ANESTHESIA EVENT (OUTPATIENT)
Dept: PERIOP | Facility: HOSPITAL | Age: 38
End: 2018-10-15

## 2018-10-15 ENCOUNTER — ANESTHESIA (OUTPATIENT)
Dept: PERIOP | Facility: HOSPITAL | Age: 38
End: 2018-10-15

## 2018-10-15 ENCOUNTER — HOSPITAL ENCOUNTER (OUTPATIENT)
Facility: HOSPITAL | Age: 38
Setting detail: HOSPITAL OUTPATIENT SURGERY
Discharge: HOME | End: 2018-10-15
Attending: SURGERY | Admitting: SURGERY

## 2018-10-15 VITALS
WEIGHT: 145 LBS | HEART RATE: 59 BPM | OXYGEN SATURATION: 98 % | BODY MASS INDEX: 21.48 KG/M2 | HEIGHT: 69 IN | SYSTOLIC BLOOD PRESSURE: 117 MMHG | TEMPERATURE: 98 F | RESPIRATION RATE: 15 BRPM | DIASTOLIC BLOOD PRESSURE: 82 MMHG

## 2018-10-15 DIAGNOSIS — N61.1 BREAST ABSCESS: ICD-10-CM

## 2018-10-15 PROCEDURE — 81025 URINE PREGNANCY TEST: CPT | Performed by: ANESTHESIOLOGY

## 2018-10-15 PROCEDURE — 25010000002 FENTANYL CITRATE (PF) 100 MCG/2ML SOLUTION: Performed by: NURSE ANESTHETIST, CERTIFIED REGISTERED

## 2018-10-15 PROCEDURE — 25010000002 ONDANSETRON PER 1 MG: Performed by: NURSE ANESTHETIST, CERTIFIED REGISTERED

## 2018-10-15 PROCEDURE — 25010000002 KETOROLAC TROMETHAMINE PER 15 MG: Performed by: NURSE ANESTHETIST, CERTIFIED REGISTERED

## 2018-10-15 PROCEDURE — 25010000002 PROPOFOL 10 MG/ML EMULSION: Performed by: NURSE ANESTHETIST, CERTIFIED REGISTERED

## 2018-10-15 PROCEDURE — 25010000003 CEFAZOLIN PER 500 MG: Performed by: SURGERY

## 2018-10-15 PROCEDURE — 19120 REMOVAL OF BREAST LESION: CPT | Performed by: SURGERY

## 2018-10-15 PROCEDURE — 25010000002 MIDAZOLAM PER 1 MG: Performed by: NURSE ANESTHETIST, CERTIFIED REGISTERED

## 2018-10-15 RX ORDER — IBUPROFEN 800 MG/1
800 TABLET ORAL EVERY 8 HOURS PRN
Qty: 20 TABLET | Refills: 0 | Status: SHIPPED | OUTPATIENT
Start: 2018-10-15

## 2018-10-15 RX ORDER — MEPERIDINE HYDROCHLORIDE 25 MG/ML
12.5 INJECTION INTRAMUSCULAR; INTRAVENOUS; SUBCUTANEOUS
Status: DISCONTINUED | OUTPATIENT
Start: 2018-10-15 | End: 2018-10-15 | Stop reason: HOSPADM

## 2018-10-15 RX ORDER — MIDAZOLAM HYDROCHLORIDE 1 MG/ML
1 INJECTION INTRAMUSCULAR; INTRAVENOUS
Status: DISCONTINUED | OUTPATIENT
Start: 2018-10-15 | End: 2018-10-15 | Stop reason: HOSPADM

## 2018-10-15 RX ORDER — MAGNESIUM HYDROXIDE 1200 MG/15ML
LIQUID ORAL AS NEEDED
Status: DISCONTINUED | OUTPATIENT
Start: 2018-10-15 | End: 2018-10-15 | Stop reason: HOSPADM

## 2018-10-15 RX ORDER — FENTANYL CITRATE 50 UG/ML
50 INJECTION, SOLUTION INTRAMUSCULAR; INTRAVENOUS
Status: DISCONTINUED | OUTPATIENT
Start: 2018-10-15 | End: 2018-10-15 | Stop reason: HOSPADM

## 2018-10-15 RX ORDER — IPRATROPIUM BROMIDE AND ALBUTEROL SULFATE 2.5; .5 MG/3ML; MG/3ML
3 SOLUTION RESPIRATORY (INHALATION) ONCE AS NEEDED
Status: DISCONTINUED | OUTPATIENT
Start: 2018-10-15 | End: 2018-10-15 | Stop reason: HOSPADM

## 2018-10-15 RX ORDER — SODIUM CHLORIDE, SODIUM LACTATE, POTASSIUM CHLORIDE, CALCIUM CHLORIDE 600; 310; 30; 20 MG/100ML; MG/100ML; MG/100ML; MG/100ML
125 INJECTION, SOLUTION INTRAVENOUS CONTINUOUS
Status: DISCONTINUED | OUTPATIENT
Start: 2018-10-15 | End: 2018-10-15 | Stop reason: HOSPADM

## 2018-10-15 RX ORDER — FAMOTIDINE 10 MG/ML
INJECTION, SOLUTION INTRAVENOUS AS NEEDED
Status: DISCONTINUED | OUTPATIENT
Start: 2018-10-15 | End: 2018-10-15 | Stop reason: SURG

## 2018-10-15 RX ORDER — MIDAZOLAM HYDROCHLORIDE 1 MG/ML
INJECTION INTRAMUSCULAR; INTRAVENOUS AS NEEDED
Status: DISCONTINUED | OUTPATIENT
Start: 2018-10-15 | End: 2018-10-15 | Stop reason: SURG

## 2018-10-15 RX ORDER — KETOROLAC TROMETHAMINE 30 MG/ML
INJECTION, SOLUTION INTRAMUSCULAR; INTRAVENOUS AS NEEDED
Status: DISCONTINUED | OUTPATIENT
Start: 2018-10-15 | End: 2018-10-15 | Stop reason: SURG

## 2018-10-15 RX ORDER — FENTANYL CITRATE 50 UG/ML
INJECTION, SOLUTION INTRAMUSCULAR; INTRAVENOUS AS NEEDED
Status: DISCONTINUED | OUTPATIENT
Start: 2018-10-15 | End: 2018-10-15 | Stop reason: SURG

## 2018-10-15 RX ORDER — MIDAZOLAM HYDROCHLORIDE 1 MG/ML
2 INJECTION INTRAMUSCULAR; INTRAVENOUS
Status: DISCONTINUED | OUTPATIENT
Start: 2018-10-15 | End: 2018-10-15 | Stop reason: HOSPADM

## 2018-10-15 RX ORDER — ONDANSETRON 2 MG/ML
INJECTION INTRAMUSCULAR; INTRAVENOUS AS NEEDED
Status: DISCONTINUED | OUTPATIENT
Start: 2018-10-15 | End: 2018-10-15 | Stop reason: SURG

## 2018-10-15 RX ORDER — SODIUM CHLORIDE 0.9 % (FLUSH) 0.9 %
3-10 SYRINGE (ML) INJECTION AS NEEDED
Status: DISCONTINUED | OUTPATIENT
Start: 2018-10-15 | End: 2018-10-15 | Stop reason: HOSPADM

## 2018-10-15 RX ORDER — PROPOFOL 10 MG/ML
VIAL (ML) INTRAVENOUS AS NEEDED
Status: DISCONTINUED | OUTPATIENT
Start: 2018-10-15 | End: 2018-10-15 | Stop reason: SURG

## 2018-10-15 RX ORDER — ONDANSETRON 2 MG/ML
4 INJECTION INTRAMUSCULAR; INTRAVENOUS ONCE AS NEEDED
Status: DISCONTINUED | OUTPATIENT
Start: 2018-10-15 | End: 2018-10-15 | Stop reason: HOSPADM

## 2018-10-15 RX ORDER — SODIUM CHLORIDE 0.9 % (FLUSH) 0.9 %
3 SYRINGE (ML) INJECTION EVERY 12 HOURS SCHEDULED
Status: DISCONTINUED | OUTPATIENT
Start: 2018-10-15 | End: 2018-10-15 | Stop reason: HOSPADM

## 2018-10-15 RX ORDER — LIDOCAINE HYDROCHLORIDE 20 MG/ML
INJECTION, SOLUTION INFILTRATION; PERINEURAL AS NEEDED
Status: DISCONTINUED | OUTPATIENT
Start: 2018-10-15 | End: 2018-10-15 | Stop reason: SURG

## 2018-10-15 RX ADMIN — FENTANYL CITRATE 50 MCG: 50 INJECTION INTRAMUSCULAR; INTRAVENOUS at 12:11

## 2018-10-15 RX ADMIN — FAMOTIDINE 20 MG: 10 INJECTION, SOLUTION INTRAVENOUS at 11:18

## 2018-10-15 RX ADMIN — CEFAZOLIN SODIUM 2 G: 2 SOLUTION INTRAVENOUS at 11:21

## 2018-10-15 RX ADMIN — ONDANSETRON 4 MG: 2 INJECTION, SOLUTION INTRAMUSCULAR; INTRAVENOUS at 12:31

## 2018-10-15 RX ADMIN — ONDANSETRON 4 MG: 2 INJECTION, SOLUTION INTRAMUSCULAR; INTRAVENOUS at 11:18

## 2018-10-15 RX ADMIN — LIDOCAINE HYDROCHLORIDE 60 MG: 20 INJECTION, SOLUTION INFILTRATION; PERINEURAL at 11:18

## 2018-10-15 RX ADMIN — FENTANYL CITRATE 50 MCG: 50 INJECTION INTRAMUSCULAR; INTRAVENOUS at 11:24

## 2018-10-15 RX ADMIN — MIDAZOLAM HYDROCHLORIDE 2 MG: 1 INJECTION, SOLUTION INTRAMUSCULAR; INTRAVENOUS at 11:13

## 2018-10-15 RX ADMIN — SODIUM CHLORIDE, POTASSIUM CHLORIDE, SODIUM LACTATE AND CALCIUM CHLORIDE: 600; 310; 30; 20 INJECTION, SOLUTION INTRAVENOUS at 11:13

## 2018-10-15 RX ADMIN — KETOROLAC TROMETHAMINE 30 MG: 30 INJECTION, SOLUTION INTRAMUSCULAR; INTRAVENOUS at 11:40

## 2018-10-15 RX ADMIN — PROPOFOL 160 MG: 10 INJECTION, EMULSION INTRAVENOUS at 11:18

## 2018-10-15 RX ADMIN — FENTANYL CITRATE 50 MCG: 50 INJECTION INTRAMUSCULAR; INTRAVENOUS at 12:16

## 2018-10-15 RX ADMIN — FENTANYL CITRATE 50 MCG: 50 INJECTION INTRAMUSCULAR; INTRAVENOUS at 11:40

## 2018-10-15 NOTE — ANESTHESIA PREPROCEDURE EVALUATION
Anesthesia Evaluation     Patient summary reviewed and Nursing notes reviewed   no history of anesthetic complications:  NPO Solid Status: > 8 hours  NPO Liquid Status: > 8 hours           Airway   Mallampati: II  TM distance: >3 FB  Neck ROM: full  no difficulty expected  Dental - normal exam   (+) edentulous, lower dentures and upper dentures    Pulmonary - normal exam   (+) a smoker Former,   (-) asthma  Cardiovascular - negative cardio ROS and normal exam  Exercise tolerance: good (4-7 METS)    NYHA Classification: II        Neuro/Psych- negative ROS  (-) seizures, CVA  GI/Hepatic/Renal/Endo - negative ROS     Musculoskeletal (-) negative ROS    Abdominal  - normal exam    Bowel sounds: normal.   Substance History - negative use     OB/GYN negative ob/gyn ROS         Other - negative ROS                         Anesthesia Plan    ASA 2     general     Anesthetic plan, all risks, benefits, and alternatives have been provided, discussed and informed consent has been obtained with: patient and spouse/significant other.  Use of blood products discussed with patient and spouse/significant other  Consented to blood products.

## 2018-10-15 NOTE — H&P (VIEW-ONLY)
"Subjective   Axel Bowers is a 38 y.o. female her today for a right breast re-check.    History of Present Illness  Ms. Bowers was seen in the office today for a recurrent right retroareolar abscess.  She underwent excision in April of this year and did well until Saturday when she woke up with the area hot and red  Allergies   Allergen Reactions   • Lortab [Hydrocodone-Acetaminophen] Hives       And nauseated       Current Outpatient Prescriptions   Medication Sig Dispense Refill   • Cholecalciferol (VITAMIN D3) 5000 units capsule capsule 5,000 Units 2 (Two) Times a Day.     • Multiple Vitamins-Minerals (MULTIVITAMIN WITH MINERALS) tablet tablet Take 1 tablet by mouth Daily.       No current facility-administered medications for this visit.      History reviewed. No pertinent past medical history.  Past Surgical History:   Procedure Laterality Date   • BREAST BIOPSY Right 4/2/2018    Procedure: Right breast biopsy;  Surgeon: Fatmata Billings MD;  Location: [facility];  Service: General     The following portions of the patient's history were reviewed and updated as appropriate: allergies, current medications, past family history, past medical history, past social history, past surgical history and problem list.    Review of systems:  Unchanged from prior except HPI    Objective   /94 (BP Location: Left arm)   Pulse 74   Ht 175.3 cm (69\")   Wt 65.2 kg (143 lb 12.8 oz)   BMI 21.24 kg/m²     Physical Exam  General:  This is a WD WN white female in no acute distress  Vital signs stable, afebrile  HEENT exam:  WNL. Sclera are anicteric.  EOMI  Neck:  supple, FROM.  No JVD.  Trachea midline.  No palpable thyroid nodules. No supraclavicular adenopathy  Lungs:  Respiratory effort normal. Auscultation: Clear, without wheezes, rhonchi, rales  Heart:  Regular rate and rhythm, without murmur, gallop, rub.  No pedal edema  Breasts: On visual inspection the breasts are symmetrical.  Examination of the right breast " demonstrates some deformity of the nipple from prior surgery.  There is a 1 cm area of fullness and erythema at the medial aspect of the areola consistent with a small recurrent retroareolar abscess.  The remainder of the right breast examination is unremarkable.  There is no right axillary adenopathy.  Examination of the left breast demonstrates no discrete mass, skin change, or axillary adenopathy.  Abdomen: Nontender, without hepatosplenomegaly  Musculoskeletal:  muscle strength/tone is normal.    Psyc:  alert, oriented x 3.  Mood and affect are appropriate  skin:  Warm with good turgor.  Without rash or lesion  extremities:  Examination of the extremities revealed no cyanosis, clubbing or edema.    Results/Data      Procedures     Assessment/Plan   Recurrent retroareolar abscess    Start oral antibiotics  Schedule reexcision of the area         Discussion/Summary    Patient's Body mass index is 21.24 kg/m². BMI is within normal parameters. No follow-up required.       Errors in dictation may reflect use of voice recognition software and not all errors in transcription may have been detected prior to signing.    No future appointments.  This document has been electronically signed by Fatmata SEGURA MD on October 6, 2018 6:07 PM

## 2018-10-15 NOTE — ANESTHESIA PROCEDURE NOTES
Airway  Urgency: elective    Airway not difficult    General Information and Staff    Patient location during procedure: OR  CRNA: MARA VELÁZQUEZ    Indications and Patient Condition  Indications for airway management: airway protection    Preoxygenated: yes  MILS maintained throughout  Mask difficulty assessment: 0 - not attempted    Final Airway Details  Final airway type: supraglottic airway      Successful airway: unique  Size 3    Number of attempts at approach: 1

## 2018-10-15 NOTE — ANESTHESIA POSTPROCEDURE EVALUATION
Patient: Axel Bowers    Procedure Summary     Date:  10/15/18 Room / Location:  [facility] OR 01 /  [facility] OR    Anesthesia Start:  1113 Anesthesia Stop:  1147    Procedure:  BREAST BIOPSY (Right Breast) Diagnosis:       Breast abscess      (Breast abscess [N61.1])    Surgeon:  Fatmata Billings MD Provider:  Mike Stiles MD    Anesthesia Type:  general ASA Status:  2          Anesthesia Type: general  Last vitals  BP   116/80 (10/15/18 1233)   Temp   98 °F (36.7 °C) (10/15/18 1233)   Pulse   58 (10/15/18 1233)   Resp   14 (10/15/18 1233)     SpO2   99 % (10/15/18 1233)     Post Anesthesia Care and Evaluation    Patient location during evaluation: PHASE II  Patient participation: complete - patient participated  Level of consciousness: awake and alert  Pain score: 1  Pain management: adequate  Airway patency: patent  Anesthetic complications: No anesthetic complications  PONV Status: controlled  Cardiovascular status: acceptable  Respiratory status: acceptable  Hydration status: acceptable

## 2018-10-16 NOTE — OP NOTE
Excision of recurrent retroareolar abscess    Pre-op:  Recurrent retroareolar abscess    Post-op:  Same    Surgeon:  Fatmata Billings M.D., F.A.C.S.    Assistant:  Vida Neil    Anesthesia:  general      Indications: Current retroareolar abscess       Procedure Details   After obtaining informed consent and receiving preoperative antibiotics and with venous compression boots in place, the patient was taken to the operating room and placed under anesthesia.  The right breast was prepped and draped in a sterile fashion.  The previous abscess was in the 3 o'clock position in the retroareolar area.  An elliptical incision in this area was made and carried down into the tissue.  A chronic cavity with residual granulation tissue was identified.  This was excised with sharp dissection down into the breast tissue.  After obtaining hemostasis the incision was closed with a single subdermal 3-0 Vicryl suture, followed by 4-0 nylon sutures.  Length of incision was 1.4 cm and area of tissue removed was 1.4 x 0.6 cm    Findings:      Estimated Blood Loss:  Minimal    Blood Administered: none           Drains: none           Specimens:   ID Type Source Tests Collected by Time   A : retroareolar abcess Tissue Breast, Right TISSUE PATHOLOGY EXAM Fatmata Billings MD 10/15/2018 1131        Grafts and Implants: No       Complications:  none           Disposition: PACU - hemodynamically stable.           Condition: stable

## 2018-10-17 LAB
LAB AP CASE REPORT: NORMAL
PATH REPORT.FINAL DX SPEC: NORMAL

## 2018-10-22 ENCOUNTER — OFFICE VISIT (OUTPATIENT)
Dept: SURGERY | Facility: CLINIC | Age: 38
End: 2018-10-22

## 2018-10-22 VITALS
SYSTOLIC BLOOD PRESSURE: 132 MMHG | DIASTOLIC BLOOD PRESSURE: 84 MMHG | HEART RATE: 80 BPM | WEIGHT: 147.4 LBS | BODY MASS INDEX: 21.83 KG/M2 | HEIGHT: 69 IN

## 2018-10-22 DIAGNOSIS — Z09 POSTOP CHECK: ICD-10-CM

## 2018-10-22 DIAGNOSIS — N61.1 BREAST ABSCESS: Primary | ICD-10-CM

## 2018-10-22 PROCEDURE — 99024 POSTOP FOLLOW-UP VISIT: CPT | Performed by: SURGERY

## 2018-10-22 RX ORDER — SULFAMETHOXAZOLE AND TRIMETHOPRIM 800; 160 MG/1; MG/1
1 TABLET ORAL 2 TIMES DAILY
Qty: 14 TABLET | Refills: 0 | Status: SHIPPED | OUTPATIENT
Start: 2018-10-22 | End: 2018-10-29

## 2018-10-22 NOTE — PROGRESS NOTES
"Subjective   Axel Bowers is a 38 y.o. female here today for post op early.    History of Present Illness  Ms. Bowers was seen in the office today for her first post op visit following excision of a recurrent right subareolar abscess.  The patient states that she did well initially but over the past 2 or 3 days she has developed creased pain and some redness around the site.  It does not feel warm to touch.  She has not had any drainage.  She has not had a fever.  Allergies   Allergen Reactions   • Lortab [Hydrocodone-Acetaminophen] Hives       And nauseated         Current Outpatient Prescriptions   Medication Sig Dispense Refill   • Cholecalciferol (VITAMIN D3) 5000 units capsule capsule 5,000 Units 2 (Two) Times a Day.     • ibuprofen (ADVIL,MOTRIN) 800 MG tablet Take 1 tablet by mouth Every 8 (Eight) Hours As Needed for pain for up to 20 doses. 20 tablet 0   • Multiple Vitamins-Minerals (MULTIVITAMIN WITH MINERALS) tablet tablet Take 1 tablet by mouth Daily.       No current facility-administered medications for this visit.        Objective   /84 (BP Location: Left arm)   Pulse 80   Ht 175.3 cm (69\")   Wt 66.9 kg (147 lb 6.4 oz)   LMP 10/10/2018 (Approximate)   BMI 21.77 kg/m²    Physical Exam  On examination this is a well-developed well-nourished female in no acute distress  HEENT examination: Within normal limits.  Conjunctiva pink.  Nose and ears appear normal.  Neck: Supple, full range of motion.  No JVD.  Musculoskeletal: Full range of motion all extremities without focal weakness. Normal gait. No digital cyanosis.  Psych: Patient is alert, oriented x3. Mood and affect are appropriate.  Breasts: There is a radial incision in the right areola at 3:00.  There is some surrounding erythema but this does not dannielle like a typical cellulitis.  Is no drainage from the incision.  There is no significant induration.  Results/Data  Pathology report was reviewed and discussed with the patient    Procedures "     Assessment/Plan   Status post right subareolar excision for recurrent abscess    Empiric antibiotics  Follow-up as needed       Discussion/Summary    Errors in dictation may reflect use of voice recognition software and not all errors in transcription may have been detected prior to signing.    Future Appointments  Date Time Provider Department Center   10/24/2018 9:00 AM Fatmata Billings MD [facility] None

## 2018-11-07 ENCOUNTER — OFFICE VISIT (OUTPATIENT)
Dept: SURGERY | Facility: CLINIC | Age: 38
End: 2018-11-07

## 2018-11-07 VITALS
HEIGHT: 69 IN | SYSTOLIC BLOOD PRESSURE: 141 MMHG | HEART RATE: 86 BPM | DIASTOLIC BLOOD PRESSURE: 90 MMHG | WEIGHT: 147.6 LBS | BODY MASS INDEX: 21.86 KG/M2

## 2018-11-07 DIAGNOSIS — N61.1 BREAST ABSCESS: Primary | ICD-10-CM

## 2018-11-07 DIAGNOSIS — Z51.89 VISIT FOR WOUND CHECK: ICD-10-CM

## 2018-11-07 PROCEDURE — 99024 POSTOP FOLLOW-UP VISIT: CPT | Performed by: SURGERY

## 2018-11-07 NOTE — PROGRESS NOTES
"Subjective   Axel Bowers is a 38 y.o. female here today for a wound check.    History of Present Illness  Ms. Bowers was seen in the office today for a wound check.  She underwent an excision of a right retroareolar abscess approximately one month ago.  Initially it healed up well but this weekend a pin-sized opening developed and has been draining and staining her bras ever since.  Allergies   Allergen Reactions   • Lortab [Hydrocodone-Acetaminophen] Hives       And nauseated       Current Outpatient Prescriptions   Medication Sig Dispense Refill   • Cholecalciferol (VITAMIN D3) 5000 units capsule capsule 5,000 Units 2 (Two) Times a Day.     • ibuprofen (ADVIL,MOTRIN) 800 MG tablet Take 1 tablet by mouth Every 8 (Eight) Hours As Needed for pain for up to 20 doses. 20 tablet 0   • Multiple Vitamins-Minerals (MULTIVITAMIN WITH MINERALS) tablet tablet Take 1 tablet by mouth Daily.       No current facility-administered medications for this visit.      History reviewed. No pertinent past medical history.  Past Surgical History:   Procedure Laterality Date   • BREAST ABSCESS INCISION AND DRAINAGE     • BREAST BIOPSY Right 4/2/2018    Procedure: Right breast biopsy;  Surgeon: Fatmata Billings MD;  Location: [facility] OR;  Service: General   • BREAST BIOPSY Right 10/15/2018    Procedure: BREAST BIOPSY;  Surgeon: Fatmata Billings MD;  Location: [facility] OR;  Service: General   • MOUTH SURGERY      tooth extraction     The following portions of the patient's history were reviewed and updated as appropriate: allergies, current medications, past family history, past medical history, past social history, past surgical history and problem list.    Review of systems:  Unchanged from prior except HPI    Objective   /90 (BP Location: Left arm)   Pulse 86   Ht 175.3 cm (69\")   Wt 67 kg (147 lb 9.6 oz)   LMP 10/10/2018 (Approximate)   BMI 21.80 kg/m²    Physical Exam  On examination this is a thin female in no acute " distress  HEENT examination: Within normal limits.  Conjunctiva pink.  Nose and ears appear normal.  Neck: Supple, full range of motion.  No JVD.  Musculoskeletal: Full range of motion all extremities without focal weakness. Normal gait. No digital cyanosis.  Psych: Patient is alert, oriented x3. Mood and affect are appropriate.  Skin:  On the right breast there is a radial scar starting at the nipple and extending medially in the 3 o'clock position.  There is a pinhole size opening in the center of the incision.  Minimal drainage is expressed.  There is no cellulitis.  Results/Data      Procedures   Procedure Note    Procedure:  Wound exploration right nipple    Location:  Right retroareolar area    Anesthesia:  Percent lidocaine with epinephrine    Description:  The risks and benefits of the procedure were discussed.  After prep of the skin with Betadine and infiltration with lidocaine a 5 mm circular incision was made around the pinhole opening and carried down into the subcutaneous.  A definite source of residual infection was not identified.  A lot of scar tissue was felt.  Due to the small size of the incision and the positive and the area further exploration was not done.  The wound was packed with quarter-inch Nu Gauze and dressing was placed    Complications:  none    Follow-up:  Patient advised to follow up if the wound does not heal or continues to drain after it has sealed up  Assessment/Plan   Wound drainage, status post excision of a recurrent subareolar abscess    Patient advised to return to the office if it does not heal up in 2 weeks for after it heals if it reopens and drains again as at that point it will need a larger surgical excision         Discussion/Summary    Patient's Body mass index is 21.8 kg/m². BMI is within normal parameters. No follow-up required.       Errors in dictation may reflect use of voice recognition software and not all errors in transcription may have been detected prior  to signing.    Future Appointments  Date Time Provider Department Center   11/7/2018 9:40 AM Fatmata Billings MD [facility] None

## 2019-01-16 ENCOUNTER — OUTSIDE FACILITY SERVICE (OUTPATIENT)
Dept: CARDIOLOGY | Facility: CLINIC | Age: 39
End: 2019-01-16

## 2019-01-16 PROCEDURE — 93227 XTRNL ECG REC<48 HR R&I: CPT | Performed by: INTERNAL MEDICINE

## 2019-03-06 ENCOUNTER — OFFICE VISIT (OUTPATIENT)
Dept: SURGERY | Facility: CLINIC | Age: 39
End: 2019-03-06

## 2019-03-06 VITALS
HEIGHT: 69 IN | SYSTOLIC BLOOD PRESSURE: 135 MMHG | WEIGHT: 144.2 LBS | DIASTOLIC BLOOD PRESSURE: 93 MMHG | BODY MASS INDEX: 21.36 KG/M2 | HEART RATE: 85 BPM

## 2019-03-06 DIAGNOSIS — L08.9 BACTERIAL SKIN INFECTION OF TRUNK: Primary | ICD-10-CM

## 2019-03-06 DIAGNOSIS — B96.89 BACTERIAL SKIN INFECTION OF TRUNK: Primary | ICD-10-CM

## 2019-03-06 PROCEDURE — 99213 OFFICE O/P EST LOW 20 MIN: CPT | Performed by: SURGERY

## 2019-03-06 NOTE — PROGRESS NOTES
Subjective   Axel Bowers is a 39 y.o. female here today for cyst that has returned.    History of Present Illness  Ms. Bowers was seen in the office today for follow-up of a recurrent right retroareolar abscess that was first excised on 10/15/18.  Patient states that once a month at the time of her menstrual cycle the area gets red swells and drains foul-smelling material.  It is also very painful.  No other changes in her medical history since her 11/7/18 visit  Allergies   Allergen Reactions   • Lortab [Hydrocodone-Acetaminophen] Hives       And nauseated       Current Outpatient Medications   Medication Sig Dispense Refill   • Cholecalciferol (VITAMIN D3) 5000 units capsule capsule 5,000 Units 2 (Two) Times a Day.     • ibuprofen (ADVIL,MOTRIN) 800 MG tablet Take 1 tablet by mouth Every 8 (Eight) Hours As Needed for pain for up to 20 doses. 20 tablet 0   • Multiple Vitamins-Minerals (MULTIVITAMIN WITH MINERALS) tablet tablet Take 1 tablet by mouth Daily.       No current facility-administered medications for this visit.      History reviewed. No pertinent past medical history.  Past Surgical History:   Procedure Laterality Date   • BREAST ABSCESS INCISION AND DRAINAGE     • BREAST BIOPSY Right 4/2/2018    Procedure: Right breast biopsy;  Surgeon: Fatmata Billings MD;  Location: [facility] OR;  Service: General   • BREAST BIOPSY Right 10/15/2018    Procedure: BREAST BIOPSY;  Surgeon: Fatmata Billings MD;  Location: [facility];  Service: General   • MOUTH SURGERY      tooth extraction       The following portions of the patient's history were reviewed and updated as appropriate: allergies, current medications, past family history, past medical history, past social history, past surgical history and problem list.    Review of systems:      Weight loss or gain negative  Draining abscess   Eyesight problems negative  ENT negative  Respiratory negative  Gastrointestinal negative  Cardiac negative  Neurologic  "negative  Psychiatric negative  Miryam/Lymph negative  Genitourinary negative  Musculoskeletal negative  Endocrine negative  Breast skin changes    Objective   /93 (BP Location: Left arm)   Pulse 85   Ht 175.3 cm (69\")   Wt 65.4 kg (144 lb 3.2 oz)   BMI 21.29 kg/m²    Physical Exam  General:  This is a WD WN thin female in no acute distress  HEENT exam:  WNL. Sclera are anicteric.  EOMI  Neck:  supple, FROM, without thyromegaly, cervical or supraclavicular adenopathy  Lungs:  Respiratory effort normal. Auscultation: Clear, without wheezes, rhonchi, rales  Heart:  Regular rate and rhythm, without murmur, gallop, rub.  No pedal edema  Abdomen: Nontender, nondistended  Musculoskeletal:  muscle strength/tone is normal.  Gait and station: normal. No digital cyanosis  Psyc:  alert, oriented x 3.  Mood and affect are appropriate  skin:  Warm with good turgor.  On the skin of the right breast in the 2 o'clock position at the edge of the areola is an area of scarring and inflammation which is the site of recurrent cyst abscess  extremities:  Examination of the extremities revealed no cyanosis, clubbing or edema.  Results/Data      Procedures     Assessment/Plan   Recurrent abscess of skin of areola, right breast    Proceed with surgical reexcision         Discussion/Summary: Risks of the procedure discussed    Patient's Body mass index is 21.29 kg/m². BMI is within normal limits.       Errors in dictation may reflect use of voice recognition software and not all errors in transcription may have been detected prior to signing.    No future appointments.  "

## 2019-03-06 NOTE — H&P
Subjective   Axel Bowers is a 39 y.o. female here today for cyst that has returned.    History of Present Illness  Ms. Bowers was seen in the office today for follow-up of a recurrent right retroareolar abscess that was first excised on 10/15/18.  Patient states that once a month at the time of her menstrual cycle the area gets red swells and drains foul-smelling material.  It is also very painful.  No other changes in her medical history since her 11/7/18 visit  Allergies   Allergen Reactions   • Lortab [Hydrocodone-Acetaminophen] Hives       And nauseated       Current Outpatient Medications   Medication Sig Dispense Refill   • Cholecalciferol (VITAMIN D3) 5000 units capsule capsule 5,000 Units 2 (Two) Times a Day.     • ibuprofen (ADVIL,MOTRIN) 800 MG tablet Take 1 tablet by mouth Every 8 (Eight) Hours As Needed for pain for up to 20 doses. 20 tablet 0   • Multiple Vitamins-Minerals (MULTIVITAMIN WITH MINERALS) tablet tablet Take 1 tablet by mouth Daily.       No current facility-administered medications for this visit.      History reviewed. No pertinent past medical history.  Past Surgical History:   Procedure Laterality Date   • BREAST ABSCESS INCISION AND DRAINAGE     • BREAST BIOPSY Right 4/2/2018    Procedure: Right breast biopsy;  Surgeon: Fatmata Billings MD;  Location: [facility] OR;  Service: General   • BREAST BIOPSY Right 10/15/2018    Procedure: BREAST BIOPSY;  Surgeon: Fatmata Billings MD;  Location: [facility];  Service: General   • MOUTH SURGERY      tooth extraction       The following portions of the patient's history were reviewed and updated as appropriate: allergies, current medications, past family history, past medical history, past social history, past surgical history and problem list.    Review of systems:      Weight loss or gain negative  Draining abscess   Eyesight problems negative  ENT negative  Respiratory negative  Gastrointestinal negative  Cardiac negative  Neurologic  "negative  Psychiatric negative  Miryam/Lymph negative  Genitourinary negative  Musculoskeletal negative  Endocrine negative  Breast skin changes    Objective   /93 (BP Location: Left arm)   Pulse 85   Ht 175.3 cm (69\")   Wt 65.4 kg (144 lb 3.2 oz)   BMI 21.29 kg/m²     Physical Exam  General:  This is a WD WN thin female in no acute distress  HEENT exam:  WNL. Sclera are anicteric.  EOMI  Neck:  supple, FROM, without thyromegaly, cervical or supraclavicular adenopathy  Lungs:  Respiratory effort normal. Auscultation: Clear, without wheezes, rhonchi, rales  Heart:  Regular rate and rhythm, without murmur, gallop, rub.  No pedal edema  Abdomen: Nontender, nondistended  Musculoskeletal:  muscle strength/tone is normal.  Gait and station: normal. No digital cyanosis  Psyc:  alert, oriented x 3.  Mood and affect are appropriate  skin:  Warm with good turgor.  On the skin of the right breast in the 2 o'clock position at the edge of the areola is an area of scarring and inflammation which is the site of recurrent cyst abscess  extremities:  Examination of the extremities revealed no cyanosis, clubbing or edema.  Results/Data      Procedures     Assessment/Plan   Recurrent abscess of skin of areola, right breast    Proceed with surgical reexcision         Discussion/Summary: Risks of the procedure discussed    Patient's Body mass index is 21.29 kg/m². BMI is within normal limits.       Errors in dictation may reflect use of voice recognition software and not all errors in transcription may have been detected prior to signing.    No future appointments.  This document has been electronically signed by Fatmata SEGURA MD on March 6, 2019 11:44 AM  "

## (undated) DEVICE — SUT VIC 4/0 P3 18IN J494G

## (undated) DEVICE — SUT VIC 3/0 SH 27IN J416H

## (undated) DEVICE — ANTIBACTERIAL UNDYED BRAIDED (POLYGLACTIN 910), SYNTHETIC ABSORBABLE SUTURE: Brand: COATED VICRYL

## (undated) DEVICE — PAD GRND REM POLYHESIVE A/ DISP

## (undated) DEVICE — SUT SILK 2/0 SH 30IN K833H

## (undated) DEVICE — UNDYED BRAIDED (POLYGLACTIN 910), SYNTHETIC ABSORBABLE SUTURE: Brand: COATED VICRYL

## (undated) DEVICE — ULTRACLEAN ACCESSORY ELECTRODE, 1 INCH COATED NEEDLE WITH EXTENDED INSULATION: Brand: ULTRACLEAN

## (undated) DEVICE — ELECTRD BLD EDGE/INSUL1P SFTY SLV 2.75IN

## (undated) DEVICE — DRAPE,T,LAPARO,TRANS,STERILE: Brand: MEDLINE

## (undated) DEVICE — SUT VIC 0/0 CT1 27IN J260H

## (undated) DEVICE — HOLDER: Brand: DEROYAL

## (undated) DEVICE — APPL CHLORAPREP W/TINT 26ML ORNG

## (undated) DEVICE — MARKR SKIN W/RULR AND LBL

## (undated) DEVICE — PK BASIC 70

## (undated) DEVICE — KT INK TISS MARGINMARKER STD 6COLOR

## (undated) DEVICE — ENCORE® LATEX MICRO SIZE 7, STERILE LATEX POWDER-FREE SURGICAL GLOVE: Brand: ENCORE

## (undated) DEVICE — BNDG ADHS CURAD FLX/FABRC 2X4IN STRL LF